# Patient Record
Sex: FEMALE | Race: WHITE | Employment: OTHER | URBAN - METROPOLITAN AREA
[De-identification: names, ages, dates, MRNs, and addresses within clinical notes are randomized per-mention and may not be internally consistent; named-entity substitution may affect disease eponyms.]

---

## 2024-03-09 ENCOUNTER — HOSPITAL ENCOUNTER (INPATIENT)
Age: 44
LOS: 9 days | Discharge: HOME | DRG: 885 | End: 2024-03-18
Attending: PSYCHIATRY & NEUROLOGY | Admitting: PSYCHIATRY & NEUROLOGY
Payer: MEDICARE

## 2024-03-09 PROBLEM — F29 PSYCHOSIS, UNSPECIFIED PSYCHOSIS TYPE (HCC): Status: ACTIVE | Noted: 2024-03-09

## 2024-03-09 PROBLEM — F23 ACUTE PSYCHOSIS (HCC): Status: ACTIVE | Noted: 2024-03-09

## 2024-03-09 PROCEDURE — 1240000000 HC EMOTIONAL WELLNESS R&B

## 2024-03-09 PROCEDURE — 6370000000 HC RX 637 (ALT 250 FOR IP)

## 2024-03-09 RX ORDER — HYDROXYZINE 50 MG/1
50 TABLET, FILM COATED ORAL 3 TIMES DAILY PRN
Status: DISCONTINUED | OUTPATIENT
Start: 2024-03-09 | End: 2024-03-18 | Stop reason: HOSPADM

## 2024-03-09 RX ORDER — DIPHENHYDRAMINE HYDROCHLORIDE 50 MG/ML
50 INJECTION INTRAMUSCULAR; INTRAVENOUS EVERY 4 HOURS PRN
Status: DISCONTINUED | OUTPATIENT
Start: 2024-03-09 | End: 2024-03-18 | Stop reason: HOSPADM

## 2024-03-09 RX ORDER — ACETAMINOPHEN 325 MG/1
650 TABLET ORAL EVERY 4 HOURS PRN
Status: DISCONTINUED | OUTPATIENT
Start: 2024-03-09 | End: 2024-03-18 | Stop reason: HOSPADM

## 2024-03-09 RX ORDER — IBUPROFEN 400 MG/1
400 TABLET ORAL EVERY 6 HOURS PRN
Status: DISCONTINUED | OUTPATIENT
Start: 2024-03-09 | End: 2024-03-18 | Stop reason: HOSPADM

## 2024-03-09 RX ORDER — TRAZODONE HYDROCHLORIDE 50 MG/1
50 TABLET ORAL NIGHTLY PRN
Status: DISCONTINUED | OUTPATIENT
Start: 2024-03-09 | End: 2024-03-18 | Stop reason: HOSPADM

## 2024-03-09 RX ORDER — OLANZAPINE 5 MG/1
5 TABLET ORAL EVERY 4 HOURS PRN
Status: DISCONTINUED | OUTPATIENT
Start: 2024-03-09 | End: 2024-03-12

## 2024-03-09 RX ORDER — POLYETHYLENE GLYCOL 3350 17 G
2 POWDER IN PACKET (EA) ORAL
Status: DISCONTINUED | OUTPATIENT
Start: 2024-03-09 | End: 2024-03-18 | Stop reason: HOSPADM

## 2024-03-09 RX ORDER — MAGNESIUM HYDROXIDE/ALUMINUM HYDROXICE/SIMETHICONE 120; 1200; 1200 MG/30ML; MG/30ML; MG/30ML
30 SUSPENSION ORAL EVERY 6 HOURS PRN
Status: DISCONTINUED | OUTPATIENT
Start: 2024-03-09 | End: 2024-03-18 | Stop reason: HOSPADM

## 2024-03-09 RX ADMIN — TRAZODONE HYDROCHLORIDE 50 MG: 50 TABLET ORAL at 22:10

## 2024-03-09 RX ADMIN — NICOTINE POLACRILEX 2 MG: 2 LOZENGE ORAL at 22:10

## 2024-03-09 RX ADMIN — HYDROXYZINE HYDROCHLORIDE 50 MG: 50 TABLET, FILM COATED ORAL at 22:10

## 2024-03-09 ASSESSMENT — LIFESTYLE VARIABLES: HOW OFTEN DO YOU HAVE A DRINK CONTAINING ALCOHOL: NEVER

## 2024-03-10 PROCEDURE — 84439 ASSAY OF FREE THYROXINE: CPT

## 2024-03-10 PROCEDURE — 36415 COLL VENOUS BLD VENIPUNCTURE: CPT

## 2024-03-10 PROCEDURE — 1240000000 HC EMOTIONAL WELLNESS R&B

## 2024-03-10 PROCEDURE — 99223 1ST HOSP IP/OBS HIGH 75: CPT

## 2024-03-10 PROCEDURE — 6370000000 HC RX 637 (ALT 250 FOR IP)

## 2024-03-10 RX ORDER — LEVOTHYROXINE SODIUM 0.05 MG/1
50 TABLET ORAL DAILY
Status: DISCONTINUED | OUTPATIENT
Start: 2024-03-10 | End: 2024-03-18 | Stop reason: HOSPADM

## 2024-03-10 RX ORDER — ALBUTEROL SULFATE 90 UG/1
2 AEROSOL, METERED RESPIRATORY (INHALATION) EVERY 6 HOURS PRN
Status: ON HOLD | COMMUNITY
End: 2024-03-18 | Stop reason: HOSPADM

## 2024-03-10 RX ORDER — DIVALPROEX SODIUM 500 MG/1
2000 TABLET, EXTENDED RELEASE ORAL NIGHTLY
COMMUNITY
Start: 2024-01-09

## 2024-03-10 RX ORDER — DIVALPROEX SODIUM 500 MG/1
1000 TABLET, EXTENDED RELEASE ORAL DAILY
Status: DISCONTINUED | OUTPATIENT
Start: 2024-03-10 | End: 2024-03-13

## 2024-03-10 RX ORDER — LEVOTHYROXINE SODIUM 0.05 MG/1
50 TABLET ORAL DAILY
COMMUNITY
Start: 2024-01-09

## 2024-03-10 RX ORDER — PALIPERIDONE 3 MG/1
3 TABLET, EXTENDED RELEASE ORAL DAILY
Status: DISCONTINUED | OUTPATIENT
Start: 2024-03-10 | End: 2024-03-12

## 2024-03-10 RX ADMIN — DIVALPROEX SODIUM 1000 MG: 500 TABLET, FILM COATED, EXTENDED RELEASE ORAL at 14:19

## 2024-03-10 RX ADMIN — NICOTINE POLACRILEX 2 MG: 2 LOZENGE ORAL at 07:19

## 2024-03-10 RX ADMIN — NICOTINE POLACRILEX 2 MG: 2 LOZENGE ORAL at 09:03

## 2024-03-10 RX ADMIN — NICOTINE POLACRILEX 2 MG: 2 LOZENGE ORAL at 15:42

## 2024-03-10 RX ADMIN — NICOTINE POLACRILEX 2 MG: 2 LOZENGE ORAL at 17:29

## 2024-03-10 RX ADMIN — LEVOTHYROXINE SODIUM 50 MCG: 50 TABLET ORAL at 14:19

## 2024-03-10 RX ADMIN — NICOTINE POLACRILEX 2 MG: 2 LOZENGE ORAL at 10:25

## 2024-03-10 RX ADMIN — OLANZAPINE 5 MG: 5 TABLET, FILM COATED ORAL at 22:32

## 2024-03-10 RX ADMIN — NICOTINE POLACRILEX 2 MG: 2 LOZENGE ORAL at 14:19

## 2024-03-10 RX ADMIN — TRAZODONE HYDROCHLORIDE 50 MG: 50 TABLET ORAL at 22:32

## 2024-03-10 RX ADMIN — NICOTINE POLACRILEX 2 MG: 2 LOZENGE ORAL at 22:32

## 2024-03-10 RX ADMIN — HYDROXYZINE HYDROCHLORIDE 50 MG: 50 TABLET, FILM COATED ORAL at 17:55

## 2024-03-10 RX ADMIN — HYDROXYZINE HYDROCHLORIDE 50 MG: 50 TABLET, FILM COATED ORAL at 07:19

## 2024-03-10 ASSESSMENT — PATIENT HEALTH QUESTIONNAIRE - PHQ9
SUM OF ALL RESPONSES TO PHQ9 QUESTIONS 1 & 2: 2
SUM OF ALL RESPONSES TO PHQ QUESTIONS 1-9: 2
SUM OF ALL RESPONSES TO PHQ QUESTIONS 1-9: 2
1. LITTLE INTEREST OR PLEASURE IN DOING THINGS: SEVERAL DAYS
2. FEELING DOWN, DEPRESSED OR HOPELESS: SEVERAL DAYS
SUM OF ALL RESPONSES TO PHQ QUESTIONS 1-9: 2
SUM OF ALL RESPONSES TO PHQ QUESTIONS 1-9: 2

## 2024-03-10 ASSESSMENT — SLEEP AND FATIGUE QUESTIONNAIRES
DO YOU USE A SLEEP AID: YES
DO YOU HAVE DIFFICULTY SLEEPING: YES
SLEEP PATTERN: UNABLE TO ASSESS
SLEEP PATTERN: DIFFICULTY FALLING ASLEEP;DISTURBED/INTERRUPTED SLEEP;INSOMNIA;RESTLESSNESS

## 2024-03-10 NOTE — PROGRESS NOTES
Behavioral Services  Medicare Certification Upon Admission    I certify that this patient's inpatient psychiatric hospital admission is medically necessary for:    [x] (1) Treatment which could reasonably be expected to improve this patient's condition,       [x] (2) Or for diagnostic study;     AND     [x](2) The inpatient psychiatric services are provided while the individual is under the care of a physician and are included in the individualized plan of care.    Estimated length of stay/service 2-5 days    Plan for post-hospital care outpatient services    Electronically signed by YO Rossi CNP on 3/10/2024 at 9:38 AM

## 2024-03-10 NOTE — H&P
Hospital Medicine History & Physical      PCP: No primary care provider on file.    Date of Admission: 3/9/2024    Date of Service: Pt seen/examined on 03/11/24       Chief Complaint:  No chief complaint on file.      History Of Present Illness:      The patient is a 43 y.o. female with PMHx hypothyroidism, bipolar disorder, and polysubstance abuse who presented to [facility] for AMS.  Patient was seen and evaluated in the ED by the ED medical provider, patient was medically cleared for admission to [facility].  This note serves as an admission medical H&P.    Patient denies any medical complaints at this time.    Past Medical History:        Diagnosis Date    Bipolar disorder (HCC)     Hypothyroid     OAB (overactive bladder)     Polysubstance abuse (HCC)        Past Surgical History:    History reviewed. No pertinent surgical history.    Medications Prior to Admission:    Prior to Admission medications    Medication Sig Start Date End Date Taking? Authorizing Provider   levothyroxine (SYNTHROID) 50 MCG tablet Take 1 tablet by mouth Daily 1/9/24  Yes Francesco Redman MD   divalproex (DEPAKOTE ER) 500 MG extended release tablet Take 2 tablets by mouth daily 1/9/24  Yes ProviderFrancesco MD   albuterol sulfate HFA (VENTOLIN HFA) 108 (90 Base) MCG/ACT inhaler Inhale 2 puffs into the lungs every 6 hours as needed for Wheezing   Yes ProviderFrancesco MD       Allergies:  Patient has no known allergies.    Social History:    Tobacco use: 2 ppd  ETOH use: denies  Illicit drug use: denies     Family History:   Positive as follows:    History reviewed. No pertinent family history.    REVIEW OF SYSTEMS:       Constitutional: Negative for fever   HENT: Negative for sore throat   Eyes: Negative for redness   Respiratory: Negative  for dyspnea, cough   Cardiovascular: Negative for chest pain   Gastrointestinal: Negative for vomiting, diarrhea   Genitourinary: Negative for hematuria   Musculoskeletal:
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
## 2024-03-10 NOTE — PROGRESS NOTES
Mary arrived on this unit with two medical transporters and one  at 20:30. Mary was ambulatory with a steady gait upon arrival. Security staff performed a thorough assessment of Mary and her belongings with a metal detector wand. No contraband was found on Mary or in her belongings. Mary was quite lethargic upon arrival. She is oriented to self, but disoriented to place, time, and situation. Mary denies SI, HI, and AVH. She was focused on smoking a cigarette & continued to ask this writer for a rolled cigarette that she had in her purse. This writer educated that smoking is against the unit policies. This writer offered her a nicotine lozenge, but was asleep before the order could be verified. Mary was agreeable to participating in the admission process.

## 2024-03-10 NOTE — BH NOTE
Mary out to the desk yelling and screaming at writer. She states she wants \"Ativan and a cigarette.\" Vistaril admin per PRN order for anxiety. She is laying down in her room at this time.

## 2024-03-10 NOTE — PROGRESS NOTES
4 Eyes Skin Assessment     NAME:  Mary Lucas  YOB: 1980  MEDICAL RECORD NUMBER:  [id number]    The patient is being assessed for  Admission    I agree that at least one RN has performed a thorough Head to Toe Skin Assessment on the patient. ALL assessment sites listed below have been assessed.      Areas assessed by both nurses:    Head, Face, Ears, Shoulders, Back, Chest, Arms, Elbows, Hands, Sacrum. Buttock, Coccyx, Ischium, and Legs. Feet and Heels        Does the Patient have a Wound? No noted wound(s)       Krishna Prevention initiated by RN: No    Wound Care Orders initiated by RN: No    Pressure Injury (Stage 3,4, Unstageable, DTI, NWPT, and Complex wounds) if present, place Wound referral order by RN under : No    New Ostomies, if present place, Ostomy referral order under : No     Nurse 1 eSignature: Electronically signed by Flavia Baron RN on 3/10/24 at 12:49 AM EST    **SHARE this note so that the co-signing nurse can place an eSignature**    Nurse 2 eSignature: {Esignature:[id number]}

## 2024-03-10 NOTE — BH NOTE
Pt at the nurses station snarling and yelling at another pt. She states \"Give me a lozenge, 150 micrograms of synthroid and 2 Ativan.\" Educated regarding med orders and Atarax offered. Atarax admin per PRN order. Pt got on the phone and called her brother, yelling at brother \"Get me out of this psych hell hole. Where is my  at,\" She then slammed down the phone. Pt going to shower now.

## 2024-03-10 NOTE — H&P
Patient labile and somewhat aggressive. H&P deferred to tomorrow.    Rosa Cuellar PA-C  3/10/2024   1:07 PM
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
## 2024-03-10 NOTE — PROGRESS NOTES
Home Medication Reconciliation Status          [] COMPLETE       Medication history has been reviewed and obtained from the following source(s):       [] patient/family verbal report             [] patient/family provided written list       [] external pharmacy   [] external facility list         []  Provider notified that home medication reconciliation is complete          [x] IN PROGRESS       Medication reconciliation marked in progress at this time due to:       [] patient/family poor historian      [] waiting arrival of family to clarify       [] waiting for accurate list        [x] external pharmacy needs called      * Follow up is needed.          [] UNABLE TO ASSESS       Medication reconciliation is incomplete and unable to assess at this time due to:       [] critical patient condition   [] patient is unresponsive        [] no family available                       [] unknown pharmacy       [] anonymous patient          * Follow up is needed.      [] Pharmacy consult placed for medication reconciliation assistance   Additional comments: Mary states that she takes medications, but is unable to provide the actual names of them. She told this writer she takes a \"nerve pill\" before bed. She is unable to identify the pharmacy she uses to fill her prescriptions. Notes from staff at [facility] indicate that Mary is not usually compliant with prescribed medications.

## 2024-03-11 PROBLEM — E87.6 HYPOKALEMIA: Status: ACTIVE | Noted: 2024-03-11

## 2024-03-11 PROBLEM — R79.89 ELEVATED TSH: Status: ACTIVE | Noted: 2024-03-11

## 2024-03-11 LAB — T4 FREE SERPL-MCNC: 0.3 NG/DL (ref 0.9–1.8)

## 2024-03-11 PROCEDURE — 1240000000 HC EMOTIONAL WELLNESS R&B

## 2024-03-11 PROCEDURE — 6370000000 HC RX 637 (ALT 250 FOR IP)

## 2024-03-11 PROCEDURE — 6360000002 HC RX W HCPCS

## 2024-03-11 PROCEDURE — 2580000003 HC RX 258

## 2024-03-11 PROCEDURE — 99221 1ST HOSP IP/OBS SF/LOW 40: CPT

## 2024-03-11 RX ORDER — IBUPROFEN 200 MG
TABLET ORAL 2 TIMES DAILY
Status: DISCONTINUED | OUTPATIENT
Start: 2024-03-11 | End: 2024-03-18 | Stop reason: HOSPADM

## 2024-03-11 RX ADMIN — NICOTINE POLACRILEX 2 MG: 2 LOZENGE ORAL at 23:57

## 2024-03-11 RX ADMIN — LEVOTHYROXINE SODIUM 50 MCG: 50 TABLET ORAL at 05:06

## 2024-03-11 RX ADMIN — DIVALPROEX SODIUM 1000 MG: 500 TABLET, FILM COATED, EXTENDED RELEASE ORAL at 07:42

## 2024-03-11 RX ADMIN — WATER 10 MG: 1 INJECTION INTRAMUSCULAR; INTRAVENOUS; SUBCUTANEOUS at 18:48

## 2024-03-11 RX ADMIN — IBUPROFEN 400 MG: 400 TABLET, FILM COATED ORAL at 19:33

## 2024-03-11 RX ADMIN — TRAZODONE HYDROCHLORIDE 50 MG: 50 TABLET ORAL at 20:52

## 2024-03-11 RX ADMIN — PALIPERIDONE 3 MG: 3 TABLET, EXTENDED RELEASE ORAL at 07:42

## 2024-03-11 RX ADMIN — NICOTINE POLACRILEX 2 MG: 2 LOZENGE ORAL at 01:40

## 2024-03-11 RX ADMIN — NICOTINE POLACRILEX 2 MG: 2 LOZENGE ORAL at 09:01

## 2024-03-11 RX ADMIN — NICOTINE POLACRILEX 2 MG: 2 LOZENGE ORAL at 05:03

## 2024-03-11 RX ADMIN — NICOTINE POLACRILEX 2 MG: 2 LOZENGE ORAL at 17:11

## 2024-03-11 RX ADMIN — POLYMYXIN B SULFATE, BACITRACIN ZINC, NEOMYCIN SULFATE: 5000; 3.5; 4 OINTMENT TOPICAL at 20:57

## 2024-03-11 RX ADMIN — NICOTINE POLACRILEX 2 MG: 2 LOZENGE ORAL at 22:49

## 2024-03-11 RX ADMIN — NICOTINE POLACRILEX 2 MG: 2 LOZENGE ORAL at 03:20

## 2024-03-11 RX ADMIN — NICOTINE POLACRILEX 2 MG: 2 LOZENGE ORAL at 19:41

## 2024-03-11 RX ADMIN — HYDROXYZINE HYDROCHLORIDE 50 MG: 50 TABLET, FILM COATED ORAL at 20:52

## 2024-03-11 RX ADMIN — IBUPROFEN 400 MG: 400 TABLET, FILM COATED ORAL at 01:47

## 2024-03-11 RX ADMIN — OLANZAPINE 5 MG: 5 TABLET, FILM COATED ORAL at 23:57

## 2024-03-11 RX ADMIN — NICOTINE POLACRILEX 2 MG: 2 LOZENGE ORAL at 07:55

## 2024-03-11 ASSESSMENT — PAIN DESCRIPTION - ORIENTATION: ORIENTATION: LOWER

## 2024-03-11 ASSESSMENT — PAIN SCALES - GENERAL
PAINLEVEL_OUTOF10: 0
PAINLEVEL_OUTOF10: 10
PAINLEVEL_OUTOF10: 7

## 2024-03-11 ASSESSMENT — PAIN DESCRIPTION - LOCATION
LOCATION: NECK
LOCATION: OTHER (COMMENT)

## 2024-03-11 ASSESSMENT — PAIN DESCRIPTION - DESCRIPTORS: DESCRIPTORS: ACHING

## 2024-03-11 NOTE — PROGRESS NOTES
Pt. At the desk, agitated because other patient was using the phone. She began banging on the desk, yelling for peer to get off the phone, it was her turn. Writer redirected but was unsuccessful. Patient calling writer and peer \"bitch\" asked patient to go to her room and calm- walked to her room and slammed the door stating she was not going in there. Offered PO zyprexa but refused. IM zyprexa given at 1850 in right deltoid. When giving injection, patient said \"I need this ativan, I love ativan\" reminded it was zyprexa and not ativan.

## 2024-03-11 NOTE — PROGRESS NOTES
Department of Psychiatry  AttendingProgress Note  Chief Complaint: Psychosis    Patient's chart was reviewed and collaborated with  about the treatment plan.    SUBJECTIVE:    Pt up on the unit.  She remains confused, disorganized, FOI.  Pt focused on her , states she was able to find his number, repeatedly calling.  Staff requested that she wait to call again, now that she knows he is safe.  Pt mentions to me that she was given an injection at [facility], staff there reports no injections in the last 5-6 years.  They report that she normally presents in a crisis and has not been prescribed meds recently due to poor compliance.  Pt unable to tell what meds she has taken recently.  Will continue current medications for stabilization and can discuss and JUNG when cognition improves.  Pt with a flat affect, poor historian and memory at this time.      Plan  Start Invega 3 mg daily (received JUNG Invega in Jan while inpatient, no record of injections after this)  Depakote ER 1000 mg daily    Patient is feeling unchanged. Suicidal ideation:  denies suicidal ideation.  Patient does not have medication side effects.    ROS: Patient has new complaints: no  Sleeping adequately:  Yes   Appetite adequate: Yes  Attending groups: No:   Visitors:No    OBJECTIVE    Physical  VITALS:  /76   Pulse 100   Temp 98.4 °F (36.9 °C) (Oral)   Resp 16   Ht 1.778 m (5' 10\")   Wt 78 kg (172 lb)   SpO2 98%   Breastfeeding No   BMI 24.68 kg/m²     Mental Status Examination:  Patients appearance was ill-appearing and hospital attire. Thoughts are Flight of ideas. Homicidal ideations none.  No abnormal movements, tics or mannerisms.  Memory intact Aims 0. Concentration Fair.   Alert and oriented X 4. Insight and Judgement impaired insight. Patient was cooperative. Patient gait normal. Mood labile, affect labile affect Hallucinations Absent, suicidal ideations no specific plan to harm self Speech

## 2024-03-11 NOTE — PROGRESS NOTES
Received pt asleep on bed at 1945. Seen at times. At 2215 seen awake, took dinner and snacks. Vitally stable. Noted anxious with bouts of impulsivity. A bit irritated but can be re-directed. Initiated interaction and stated that she should not be here. She was tearful and wanting to go home. Explained her present situation and somehow can be re-directed. She denies AVH and delusional thoughts but noted with flight of ideas. Can follow simple commands. PRN zyprexa, trazodone and lozenges given at 2232 with good effect. She was able to go back on sleep at 2339. Seen at times. Seen awake again at 0138 asking for lozenges. PRN lozenges given at 0140. Needs attended.

## 2024-03-12 PROCEDURE — 6370000000 HC RX 637 (ALT 250 FOR IP)

## 2024-03-12 PROCEDURE — 99232 SBSQ HOSP IP/OBS MODERATE 35: CPT

## 2024-03-12 PROCEDURE — 1240000000 HC EMOTIONAL WELLNESS R&B

## 2024-03-12 RX ORDER — OLANZAPINE 10 MG/1
10 TABLET ORAL EVERY 4 HOURS PRN
Status: DISCONTINUED | OUTPATIENT
Start: 2024-03-12 | End: 2024-03-18 | Stop reason: HOSPADM

## 2024-03-12 RX ADMIN — ACETAMINOPHEN 650 MG: 325 TABLET ORAL at 14:59

## 2024-03-12 RX ADMIN — NICOTINE POLACRILEX 2 MG: 2 LOZENGE ORAL at 20:43

## 2024-03-12 RX ADMIN — NICOTINE POLACRILEX 2 MG: 2 LOZENGE ORAL at 03:51

## 2024-03-12 RX ADMIN — POLYMYXIN B SULFATE, BACITRACIN ZINC, NEOMYCIN SULFATE: 5000; 3.5; 4 OINTMENT TOPICAL at 20:44

## 2024-03-12 RX ADMIN — NICOTINE POLACRILEX 2 MG: 2 LOZENGE ORAL at 06:32

## 2024-03-12 RX ADMIN — OLANZAPINE 10 MG: 10 TABLET, FILM COATED ORAL at 18:34

## 2024-03-12 RX ADMIN — TRAZODONE HYDROCHLORIDE 50 MG: 50 TABLET ORAL at 20:43

## 2024-03-12 RX ADMIN — NICOTINE POLACRILEX 2 MG: 2 LOZENGE ORAL at 19:09

## 2024-03-12 RX ADMIN — POLYMYXIN B SULFATE, BACITRACIN ZINC, NEOMYCIN SULFATE: 5000; 3.5; 4 OINTMENT TOPICAL at 08:49

## 2024-03-12 RX ADMIN — LEVOTHYROXINE SODIUM 50 MCG: 50 TABLET ORAL at 06:28

## 2024-03-12 RX ADMIN — DIVALPROEX SODIUM 1000 MG: 500 TABLET, FILM COATED, EXTENDED RELEASE ORAL at 08:49

## 2024-03-12 RX ADMIN — NICOTINE POLACRILEX 2 MG: 2 LOZENGE ORAL at 15:58

## 2024-03-12 RX ADMIN — IBUPROFEN 400 MG: 400 TABLET, FILM COATED ORAL at 18:34

## 2024-03-12 RX ADMIN — PALIPERIDONE 3 MG: 3 TABLET, EXTENDED RELEASE ORAL at 08:49

## 2024-03-12 RX ADMIN — NICOTINE POLACRILEX 2 MG: 2 LOZENGE ORAL at 17:42

## 2024-03-12 RX ADMIN — NICOTINE POLACRILEX 2 MG: 2 LOZENGE ORAL at 23:35

## 2024-03-12 RX ADMIN — NICOTINE POLACRILEX 2 MG: 2 LOZENGE ORAL at 11:24

## 2024-03-12 RX ADMIN — NICOTINE POLACRILEX 2 MG: 2 LOZENGE ORAL at 02:33

## 2024-03-12 RX ADMIN — HYDROXYZINE HYDROCHLORIDE 50 MG: 50 TABLET, FILM COATED ORAL at 20:43

## 2024-03-12 RX ADMIN — NICOTINE POLACRILEX 2 MG: 2 LOZENGE ORAL at 13:04

## 2024-03-12 RX ADMIN — NICOTINE POLACRILEX 2 MG: 2 LOZENGE ORAL at 14:14

## 2024-03-12 RX ADMIN — ACETAMINOPHEN 650 MG: 325 TABLET ORAL at 00:40

## 2024-03-12 RX ADMIN — NICOTINE POLACRILEX 2 MG: 2 LOZENGE ORAL at 10:22

## 2024-03-12 RX ADMIN — OLANZAPINE 10 MG: 10 TABLET, FILM COATED ORAL at 23:35

## 2024-03-12 ASSESSMENT — PAIN DESCRIPTION - LOCATION
LOCATION: FOOT
LOCATION: TEETH
LOCATION: HEAD
LOCATION: TEETH

## 2024-03-12 ASSESSMENT — PAIN SCALES - WONG BAKER: WONGBAKER_NUMERICALRESPONSE: NO HURT

## 2024-03-12 ASSESSMENT — PAIN DESCRIPTION - DESCRIPTORS
DESCRIPTORS: ACHING;DISCOMFORT
DESCRIPTORS: ACHING
DESCRIPTORS: ACHING

## 2024-03-12 ASSESSMENT — PAIN SCALES - GENERAL
PAINLEVEL_OUTOF10: 5
PAINLEVEL_OUTOF10: 0
PAINLEVEL_OUTOF10: 0
PAINLEVEL_OUTOF10: 5
PAINLEVEL_OUTOF10: 5

## 2024-03-12 ASSESSMENT — PAIN DESCRIPTION - ORIENTATION
ORIENTATION: UPPER
ORIENTATION: UPPER
ORIENTATION: RIGHT

## 2024-03-12 NOTE — PROGRESS NOTES
Department of Psychiatry  AttendingProgress Note  Chief Complaint: Psychosis    Patient's chart was reviewed and collaborated with  about the treatment plan.    SUBJECTIVE:    Pt up on the unit.  Pt remains with labile moods, can be speaking calmly and then begin yelling for no reason.  Able to be redirected.  FOI, tangential, disorganized.  I discussed her medications, starting back on her JUNG Invega (Given while inpatient at [facility], Invega Sustena 234 mg on 1/3/2024 and 156 mg IM on 1/7/2024. Next dose of 156 mg IM was due on 2/5/2024, no record of receiving). Pt continues to tell me she got Prolixin at [facility], we discussed them not treating her for several years.  Pt states it is because she smoked crack before her last few appointments and they told her to leave.  Pt focused on running for president in 2025.      Plan  Start Invega 3 mg daily (received JUNG Invega in Jan while inpatient, no record of injections after this)  Depakote ER 1000 mg daily  3/12 - Stop PO Invega, IM Invega Sustenna 156 mg given    Patient is feeling unchanged. Suicidal ideation:  denies suicidal ideation.  Patient does not have medication side effects.    ROS: Patient has new complaints: no  Sleeping adequately:  Yes   Appetite adequate: Yes  Attending groups: No:   Visitors:No    OBJECTIVE    Physical  VITALS:  BP (!) 98/57   Pulse 97   Temp 97.5 °F (36.4 °C) (Oral)   Resp 16   Ht 1.778 m (5' 10\")   Wt 78 kg (172 lb)   SpO2 98%   Breastfeeding No   BMI 24.68 kg/m²     Mental Status Examination:  Patients appearance was ill-appearing and hospital attire. Thoughts are Flight of ideas. Homicidal ideations none.  No abnormal movements, tics or mannerisms.  Memory intact Aims 0. Concentration Fair.   Alert and oriented X 4. Insight and Judgement impaired insight. Patient was cooperative. Patient gait normal. Mood labile, affect labile affect Hallucinations Absent, suicidal ideations no specific plan to

## 2024-03-12 NOTE — BH NOTE
Patient continues to request and demand ativan when told she does not have ativan ordered patient states \" well what about giving me valium then.'' Patient ask to make a phone call the [facility]. Patient observed on phone stating \" I told you they won't give me my damn ativan, I'll just get it when I get home.. I know I smoked crack I told them.\"

## 2024-03-12 NOTE — PROGRESS NOTES
2052, patient noted with bouts of anxiousness, irritable at times, Atarax 50mg & Trazodone 50mg given as PRN.

## 2024-03-12 NOTE — BH NOTE
Patient irritable yelling wanting ativan then ask for ibuprofen for tooth pain and \" a prn\" patient irritable and offered prn zyprexa for agitation. Patient agreeable. Prn Ibuprofen and zyprexa received.

## 2024-03-13 LAB — VALPROATE SERPL-MCNC: 41 UG/ML (ref 50–100)

## 2024-03-13 PROCEDURE — 6370000000 HC RX 637 (ALT 250 FOR IP)

## 2024-03-13 PROCEDURE — 6360000002 HC RX W HCPCS

## 2024-03-13 PROCEDURE — 2580000003 HC RX 258

## 2024-03-13 PROCEDURE — 36415 COLL VENOUS BLD VENIPUNCTURE: CPT

## 2024-03-13 PROCEDURE — 80164 ASSAY DIPROPYLACETIC ACD TOT: CPT

## 2024-03-13 PROCEDURE — 1240000000 HC EMOTIONAL WELLNESS R&B

## 2024-03-13 PROCEDURE — 99232 SBSQ HOSP IP/OBS MODERATE 35: CPT

## 2024-03-13 RX ORDER — HALOPERIDOL 5 MG/1
5 TABLET ORAL EVERY MORNING
COMMUNITY

## 2024-03-13 RX ORDER — DIVALPROEX SODIUM 500 MG/1
1500 TABLET, EXTENDED RELEASE ORAL DAILY
Status: DISCONTINUED | OUTPATIENT
Start: 2024-03-14 | End: 2024-03-18 | Stop reason: HOSPADM

## 2024-03-13 RX ORDER — HALOPERIDOL 5 MG/1
5 TABLET ORAL EVERY MORNING
Status: DISCONTINUED | OUTPATIENT
Start: 2024-03-13 | End: 2024-03-18 | Stop reason: HOSPADM

## 2024-03-13 RX ORDER — ALPRAZOLAM 0.5 MG/1
0.5 TABLET ORAL 2 TIMES DAILY
Status: ON HOLD | COMMUNITY
End: 2024-03-18 | Stop reason: HOSPADM

## 2024-03-13 RX ORDER — LORAZEPAM 2 MG/1
2 TABLET ORAL ONCE
Status: COMPLETED | OUTPATIENT
Start: 2024-03-13 | End: 2024-03-13

## 2024-03-13 RX ORDER — HYDROXYZINE 50 MG/1
50 TABLET, FILM COATED ORAL 3 TIMES DAILY PRN
COMMUNITY

## 2024-03-13 RX ORDER — HALOPERIDOL 10 MG/1
10 TABLET ORAL NIGHTLY
COMMUNITY

## 2024-03-13 RX ORDER — LORAZEPAM 2 MG/ML
2 INJECTION INTRAMUSCULAR ONCE
Status: DISCONTINUED | OUTPATIENT
Start: 2024-03-13 | End: 2024-03-13

## 2024-03-13 RX ORDER — HALOPERIDOL 10 MG/1
10 TABLET ORAL NIGHTLY
Status: DISCONTINUED | OUTPATIENT
Start: 2024-03-13 | End: 2024-03-18 | Stop reason: HOSPADM

## 2024-03-13 RX ADMIN — IBUPROFEN 400 MG: 400 TABLET, FILM COATED ORAL at 01:20

## 2024-03-13 RX ADMIN — LORAZEPAM 2 MG: 2 TABLET ORAL at 10:59

## 2024-03-13 RX ADMIN — TRAZODONE HYDROCHLORIDE 50 MG: 50 TABLET ORAL at 22:19

## 2024-03-13 RX ADMIN — NICOTINE POLACRILEX 2 MG: 2 LOZENGE ORAL at 15:23

## 2024-03-13 RX ADMIN — NICOTINE POLACRILEX 2 MG: 2 LOZENGE ORAL at 18:41

## 2024-03-13 RX ADMIN — HALOPERIDOL 5 MG: 5 TABLET ORAL at 10:29

## 2024-03-13 RX ADMIN — NICOTINE POLACRILEX 2 MG: 2 LOZENGE ORAL at 08:13

## 2024-03-13 RX ADMIN — NICOTINE POLACRILEX 2 MG: 2 LOZENGE ORAL at 07:12

## 2024-03-13 RX ADMIN — NICOTINE POLACRILEX 2 MG: 2 LOZENGE ORAL at 10:30

## 2024-03-13 RX ADMIN — NICOTINE POLACRILEX 2 MG: 2 LOZENGE ORAL at 04:14

## 2024-03-13 RX ADMIN — HYDROXYZINE HYDROCHLORIDE 50 MG: 50 TABLET, FILM COATED ORAL at 13:19

## 2024-03-13 RX ADMIN — NICOTINE POLACRILEX 2 MG: 2 LOZENGE ORAL at 09:27

## 2024-03-13 RX ADMIN — LEVOTHYROXINE SODIUM 50 MCG: 50 TABLET ORAL at 05:47

## 2024-03-13 RX ADMIN — NICOTINE POLACRILEX 2 MG: 2 LOZENGE ORAL at 01:21

## 2024-03-13 RX ADMIN — HYDROXYZINE HYDROCHLORIDE 50 MG: 50 TABLET, FILM COATED ORAL at 22:19

## 2024-03-13 RX ADMIN — NICOTINE POLACRILEX 2 MG: 2 LOZENGE ORAL at 03:03

## 2024-03-13 RX ADMIN — NICOTINE POLACRILEX 2 MG: 2 LOZENGE ORAL at 17:38

## 2024-03-13 RX ADMIN — NICOTINE POLACRILEX 2 MG: 2 LOZENGE ORAL at 05:47

## 2024-03-13 RX ADMIN — NICOTINE POLACRILEX 2 MG: 2 LOZENGE ORAL at 13:19

## 2024-03-13 RX ADMIN — WATER 10 MG: 1 INJECTION INTRAMUSCULAR; INTRAVENOUS; SUBCUTANEOUS at 09:38

## 2024-03-13 RX ADMIN — DIVALPROEX SODIUM 1000 MG: 500 TABLET, FILM COATED, EXTENDED RELEASE ORAL at 08:13

## 2024-03-13 ASSESSMENT — PAIN DESCRIPTION - DESCRIPTORS: DESCRIPTORS: THROBBING

## 2024-03-13 ASSESSMENT — PAIN SCALES - GENERAL
PAINLEVEL_OUTOF10: 10
PAINLEVEL_OUTOF10: 0

## 2024-03-13 ASSESSMENT — PAIN DESCRIPTION - LOCATION: LOCATION: TEETH

## 2024-03-13 ASSESSMENT — PAIN DESCRIPTION - ORIENTATION: ORIENTATION: RIGHT;LOWER

## 2024-03-13 NOTE — BH NOTE
ANIA completed and faxed the forms for Probate Court to [place] [phone]. ANIA anticipates probate to be scheduled for next Tuesday 3/19/24.    Electronically signed by MILLER Billingsley LSW on 3/13/2024 at 4:06 PM

## 2024-03-13 NOTE — PROGRESS NOTES
Pt out for dinner and made some phone calls. She is more calm at this time not yelling at staff but does remain demanding. No one answered. She is talking about d/c and not wanting to be probated. Discussed signing in voluntary to patient and explain this would keep the decision for her d/c to be between her and her provider. Patient agreeable to sign in voluntary at this time.
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
## 2024-03-13 NOTE — PROGRESS NOTES
Pt remains elevated yelling and upset that she is not getting discharged. She is unable to give an address for d/c and has FOI and non sensible language when discussing d/c plans. 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  She goes into room and bangs on walls and she does attempt to climb desk but gets down quickly when directed. Pt is oriented to person only at this time. She did eat breakfast and is noted to be urinating with no complaints. Patient also denies pain.

## 2024-03-13 NOTE — PROGRESS NOTES
Pt took morning med and thinks she is going home today advsd her that she needs to speak to the doctor when she will be d/c

## 2024-03-13 NOTE — PROGRESS NOTES
Patient came out to the team station & was yelling loudly asking for a cigarette & 2 ativan. Patient demanded that I get her ativan now. \"It is prescribed\". Patient was instructed that she was not ordered ativan here. Patient was given commit lozenge 2 mg po for nicotine craving & zyprexa 10 mg given po for agitation. Patient returned to her room. Laura Carvalho R.N
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
## 2024-03-14 PROCEDURE — 99232 SBSQ HOSP IP/OBS MODERATE 35: CPT

## 2024-03-14 PROCEDURE — 1240000000 HC EMOTIONAL WELLNESS R&B

## 2024-03-14 PROCEDURE — 6370000000 HC RX 637 (ALT 250 FOR IP)

## 2024-03-14 RX ORDER — NICOTINE 21 MG/24HR
1 PATCH, TRANSDERMAL 24 HOURS TRANSDERMAL DAILY
Status: DISCONTINUED | OUTPATIENT
Start: 2024-03-14 | End: 2024-03-18 | Stop reason: HOSPADM

## 2024-03-14 RX ADMIN — TRAZODONE HYDROCHLORIDE 50 MG: 50 TABLET ORAL at 21:05

## 2024-03-14 RX ADMIN — NICOTINE POLACRILEX 2 MG: 2 LOZENGE ORAL at 16:28

## 2024-03-14 RX ADMIN — NICOTINE POLACRILEX 2 MG: 2 LOZENGE ORAL at 05:32

## 2024-03-14 RX ADMIN — OLANZAPINE 10 MG: 10 TABLET, FILM COATED ORAL at 21:05

## 2024-03-14 RX ADMIN — NICOTINE POLACRILEX 2 MG: 2 LOZENGE ORAL at 00:43

## 2024-03-14 RX ADMIN — NICOTINE POLACRILEX 2 MG: 2 LOZENGE ORAL at 20:36

## 2024-03-14 RX ADMIN — NICOTINE POLACRILEX 2 MG: 2 LOZENGE ORAL at 15:26

## 2024-03-14 RX ADMIN — NICOTINE POLACRILEX 2 MG: 2 LOZENGE ORAL at 06:49

## 2024-03-14 RX ADMIN — NICOTINE POLACRILEX 2 MG: 2 LOZENGE ORAL at 19:11

## 2024-03-14 RX ADMIN — POLYMYXIN B SULFATE, BACITRACIN ZINC, NEOMYCIN SULFATE: 5000; 3.5; 4 OINTMENT TOPICAL at 01:05

## 2024-03-14 RX ADMIN — POLYMYXIN B SULFATE, BACITRACIN ZINC, NEOMYCIN SULFATE: 5000; 3.5; 4 OINTMENT TOPICAL at 23:00

## 2024-03-14 RX ADMIN — ACETAMINOPHEN 650 MG: 325 TABLET ORAL at 05:27

## 2024-03-14 RX ADMIN — NICOTINE POLACRILEX 2 MG: 2 LOZENGE ORAL at 12:26

## 2024-03-14 RX ADMIN — NICOTINE POLACRILEX 2 MG: 2 LOZENGE ORAL at 09:18

## 2024-03-14 RX ADMIN — NICOTINE POLACRILEX 2 MG: 2 LOZENGE ORAL at 22:40

## 2024-03-14 RX ADMIN — NICOTINE POLACRILEX 2 MG: 2 LOZENGE ORAL at 11:18

## 2024-03-14 RX ADMIN — POLYMYXIN B SULFATE, BACITRACIN ZINC, NEOMYCIN SULFATE: 5000; 3.5; 4 OINTMENT TOPICAL at 00:50

## 2024-03-14 RX ADMIN — LEVOTHYROXINE SODIUM 50 MCG: 50 TABLET ORAL at 05:27

## 2024-03-14 RX ADMIN — NICOTINE POLACRILEX 2 MG: 2 LOZENGE ORAL at 08:13

## 2024-03-14 RX ADMIN — DIVALPROEX SODIUM 1500 MG: 500 TABLET, FILM COATED, EXTENDED RELEASE ORAL at 08:13

## 2024-03-14 RX ADMIN — NICOTINE POLACRILEX 2 MG: 2 LOZENGE ORAL at 17:12

## 2024-03-14 RX ADMIN — NICOTINE POLACRILEX 2 MG: 2 LOZENGE ORAL at 10:16

## 2024-03-14 ASSESSMENT — PAIN DESCRIPTION - LOCATION: LOCATION: HEAD

## 2024-03-14 NOTE — PROGRESS NOTES
Pt noted to be sitting at nursing station RTIS amb talking to her self and having conversations with herself. She has had moments of increased agitations and yelling on unit but has remained overall more calm so far this shift.
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
## 2024-03-14 NOTE — PROGRESS NOTES
Pt has two bags full of coloring books and magazines from unit attempt to remove them and she grabs them from me and states aaron gave them to her. Explain to her that we do not allow patients to give other patients personal belongings but only allow them to donate to unit and she take them back to her room and states \"fuck you\" told her when she was d/c they would not allow her to keep all the belongings and she has been warned.

## 2024-03-14 NOTE — PROGRESS NOTES
Pt is visible on unit this morning and she is more calm and cooperative but remains demanding. She is A&O X3 disoriented to situation stating she is her [place] and states \"we are both pregnant\". . Pt denies current SI/HI/VH/AH and agrees to communicate with staff if no longer feel safe or in control. She does start yelling when asking questions. She makes a phone call this am but will not tell me who she called. She left a message stating she is getting discharged today. Discussed with patient there is no plan for her to be d/c and it would be between her and provider today. Pt did meet with  this morning and is currently eating breakfast with peers.

## 2024-03-14 NOTE — PROGRESS NOTES
0230  Pt put on call light to say she needed to go to the bathroom.She did get up to bathroom with stand by assistance from this nurse. Pt said she had a terrible HA pointing to frontal area and right neck.  Pt laid on bed for a minute or two and was asleep before medication could be given for HA.  Stated she wanting Ativan and Gabapentin. Also said she wanted stronger meds  for pain.  Pt fell right back to sleep.

## 2024-03-14 NOTE — PROGRESS NOTES
Pt has had a much better day today. She has had a couple outburst when she gets upset about not being d/c and yells very loud and did slam door X1. Overall her behaviors have been better and she has not been as elevated. She has made several phone calls and is able to remain appropriate. Pt still has FOI and disorganized speech and is noted to be RTIS t/o shift. +meals. No prn needed.

## 2024-03-15 PROCEDURE — 6370000000 HC RX 637 (ALT 250 FOR IP)

## 2024-03-15 PROCEDURE — 1240000000 HC EMOTIONAL WELLNESS R&B

## 2024-03-15 PROCEDURE — 99232 SBSQ HOSP IP/OBS MODERATE 35: CPT

## 2024-03-15 PROCEDURE — 6360000002 HC RX W HCPCS

## 2024-03-15 RX ADMIN — POLYMYXIN B SULFATE, BACITRACIN ZINC, NEOMYCIN SULFATE: 5000; 3.5; 4 OINTMENT TOPICAL at 10:06

## 2024-03-15 RX ADMIN — NICOTINE POLACRILEX 2 MG: 2 LOZENGE ORAL at 11:52

## 2024-03-15 RX ADMIN — DIVALPROEX SODIUM 1500 MG: 500 TABLET, FILM COATED, EXTENDED RELEASE ORAL at 07:40

## 2024-03-15 RX ADMIN — OLANZAPINE 10 MG: 10 TABLET, FILM COATED ORAL at 19:33

## 2024-03-15 RX ADMIN — NICOTINE POLACRILEX 2 MG: 2 LOZENGE ORAL at 08:06

## 2024-03-15 RX ADMIN — HALOPERIDOL 10 MG: 10 TABLET ORAL at 00:39

## 2024-03-15 RX ADMIN — IBUPROFEN 400 MG: 400 TABLET, FILM COATED ORAL at 23:01

## 2024-03-15 RX ADMIN — NICOTINE POLACRILEX 2 MG: 2 LOZENGE ORAL at 12:40

## 2024-03-15 RX ADMIN — LEVOTHYROXINE SODIUM 50 MCG: 50 TABLET ORAL at 07:00

## 2024-03-15 RX ADMIN — POLYMYXIN B SULFATE, BACITRACIN ZINC, NEOMYCIN SULFATE: 5000; 3.5; 4 OINTMENT TOPICAL at 20:35

## 2024-03-15 RX ADMIN — NICOTINE POLACRILEX 2 MG: 2 LOZENGE ORAL at 14:49

## 2024-03-15 RX ADMIN — DIPHENHYDRAMINE HYDROCHLORIDE 50 MG: 50 INJECTION INTRAMUSCULAR; INTRAVENOUS at 21:50

## 2024-03-15 RX ADMIN — NICOTINE POLACRILEX 2 MG: 2 LOZENGE ORAL at 00:17

## 2024-03-15 RX ADMIN — NICOTINE POLACRILEX 2 MG: 2 LOZENGE ORAL at 03:39

## 2024-03-15 RX ADMIN — HALOPERIDOL 5 MG: 5 TABLET ORAL at 07:40

## 2024-03-15 RX ADMIN — NICOTINE POLACRILEX 2 MG: 2 LOZENGE ORAL at 16:43

## 2024-03-15 RX ADMIN — NICOTINE POLACRILEX 2 MG: 2 LOZENGE ORAL at 19:27

## 2024-03-15 RX ADMIN — NICOTINE POLACRILEX 2 MG: 2 LOZENGE ORAL at 02:14

## 2024-03-15 RX ADMIN — NICOTINE POLACRILEX 2 MG: 2 LOZENGE ORAL at 07:07

## 2024-03-15 RX ADMIN — NICOTINE POLACRILEX 2 MG: 2 LOZENGE ORAL at 10:43

## 2024-03-15 RX ADMIN — HYDROXYZINE HYDROCHLORIDE 50 MG: 50 TABLET, FILM COATED ORAL at 20:35

## 2024-03-15 RX ADMIN — NICOTINE POLACRILEX 2 MG: 2 LOZENGE ORAL at 18:05

## 2024-03-15 RX ADMIN — NICOTINE POLACRILEX 2 MG: 2 LOZENGE ORAL at 13:54

## 2024-03-15 RX ADMIN — NICOTINE POLACRILEX 2 MG: 2 LOZENGE ORAL at 20:53

## 2024-03-15 RX ADMIN — NICOTINE POLACRILEX 2 MG: 2 LOZENGE ORAL at 09:44

## 2024-03-15 ASSESSMENT — PAIN SCALES - GENERAL: PAINLEVEL_OUTOF10: 5
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
## 2024-03-15 NOTE — BH NOTE
Mary at the nurses station yelling at writer to give \"me my benzos\". She keeps getting louder, then screams for security. Zyprexa admin per PRN order. She began to scream that she wanted a shot. Educated that she could not have IM Zyprexa after taking PO. She went to bed and is resting quietly now.

## 2024-03-16 PROCEDURE — 6370000000 HC RX 637 (ALT 250 FOR IP)

## 2024-03-16 PROCEDURE — 99233 SBSQ HOSP IP/OBS HIGH 50: CPT | Performed by: PSYCHIATRY & NEUROLOGY

## 2024-03-16 PROCEDURE — 1240000000 HC EMOTIONAL WELLNESS R&B

## 2024-03-16 RX ADMIN — NICOTINE POLACRILEX 2 MG: 2 LOZENGE ORAL at 13:59

## 2024-03-16 RX ADMIN — NICOTINE POLACRILEX 2 MG: 2 LOZENGE ORAL at 17:31

## 2024-03-16 RX ADMIN — NICOTINE POLACRILEX 2 MG: 2 LOZENGE ORAL at 23:54

## 2024-03-16 RX ADMIN — NICOTINE POLACRILEX 2 MG: 2 LOZENGE ORAL at 20:04

## 2024-03-16 RX ADMIN — NICOTINE POLACRILEX 2 MG: 2 LOZENGE ORAL at 10:09

## 2024-03-16 RX ADMIN — NICOTINE POLACRILEX 2 MG: 2 LOZENGE ORAL at 21:06

## 2024-03-16 RX ADMIN — LEVOTHYROXINE SODIUM 50 MCG: 50 TABLET ORAL at 07:42

## 2024-03-16 RX ADMIN — NICOTINE POLACRILEX 2 MG: 2 LOZENGE ORAL at 16:31

## 2024-03-16 RX ADMIN — NICOTINE POLACRILEX 2 MG: 2 LOZENGE ORAL at 00:31

## 2024-03-16 RX ADMIN — TRAZODONE HYDROCHLORIDE 50 MG: 50 TABLET ORAL at 22:15

## 2024-03-16 RX ADMIN — NICOTINE POLACRILEX 2 MG: 2 LOZENGE ORAL at 22:15

## 2024-03-16 RX ADMIN — NICOTINE POLACRILEX 2 MG: 2 LOZENGE ORAL at 12:41

## 2024-03-16 RX ADMIN — DIVALPROEX SODIUM 1500 MG: 500 TABLET, FILM COATED, EXTENDED RELEASE ORAL at 07:58

## 2024-03-16 RX ADMIN — OLANZAPINE 10 MG: 10 TABLET, FILM COATED ORAL at 04:14

## 2024-03-16 RX ADMIN — IBUPROFEN 400 MG: 400 TABLET, FILM COATED ORAL at 21:00

## 2024-03-16 RX ADMIN — HALOPERIDOL 5 MG: 5 TABLET ORAL at 08:03

## 2024-03-16 RX ADMIN — POLYMYXIN B SULFATE, BACITRACIN ZINC, NEOMYCIN SULFATE: 5000; 3.5; 4 OINTMENT TOPICAL at 10:32

## 2024-03-16 RX ADMIN — HYDROXYZINE HYDROCHLORIDE 50 MG: 50 TABLET, FILM COATED ORAL at 22:15

## 2024-03-16 RX ADMIN — OLANZAPINE 10 MG: 10 TABLET, FILM COATED ORAL at 07:58

## 2024-03-16 RX ADMIN — OLANZAPINE 10 MG: 10 TABLET, FILM COATED ORAL at 18:18

## 2024-03-16 RX ADMIN — NICOTINE POLACRILEX 2 MG: 2 LOZENGE ORAL at 14:53

## 2024-03-16 RX ADMIN — POLYMYXIN B SULFATE, BACITRACIN ZINC, NEOMYCIN SULFATE: 5000; 3.5; 4 OINTMENT TOPICAL at 20:54

## 2024-03-16 RX ADMIN — NICOTINE POLACRILEX 2 MG: 2 LOZENGE ORAL at 18:35

## 2024-03-16 ASSESSMENT — PAIN SCALES - GENERAL
PAINLEVEL_OUTOF10: 1
PAINLEVEL_OUTOF10: 0
PAINLEVEL_OUTOF10: 10
PAINLEVEL_OUTOF10: 0

## 2024-03-16 ASSESSMENT — PAIN DESCRIPTION - PAIN TYPE: TYPE: CHRONIC PAIN

## 2024-03-16 NOTE — PROGRESS NOTES
Patient requested and received nicotine vicente 2 mg . When asked if the lozenges help she screamed back,\"I want a damn cigarette.\" And she continued to yell at the nurses station, \"I'm going to smoke as soon as I get out of this hell hole!\" Patient said they help but she prefers a real cigarette.patient began to yell about nicotine and that she wanted to be transferred to somewhere that she can smoke.

## 2024-03-16 NOTE — PROGRESS NOTES
Patient became paranoid when another patient was given their scheduled meds. Patient claimed that this nurse was giving another patient her xanax. This nurse attempted to deescalate the situation by attempting to explain to the patient that that wasn't her medication and that other patient was not given xanax. Patient refused to deescalate. Refusing to listen to this nurse. Calling this nurse derogatory insults and yelling across unit. Charge nurse came over and talked patient down. Charge nurse encouraged patient to take IM benadryl for anxiety and agitation. Patient resting quietly in room currently.

## 2024-03-16 NOTE — PROGRESS NOTES
Patient irritable with loud tone of voice. Saying inappropriate words to staff. PRN zyprexa given at 0414. Settled on bed afterwards.

## 2024-03-16 NOTE — PROGRESS NOTES
Department of Psychiatry  AttendingProgress Note  Chief Complaint: psychosis   Mary was given prn zyprexa last night at 4AM. Was loud, irritable and and inappropriate behavior. Yelling at nursing.   Fell asleep after zyprexa. She was given 10 mg at 4AM and 8 AM .     Patient's chart was reviewed and collaborated with  about the treatment plan.  SUBJECTIVE:    Patient is feeling unchanged. Suicidal ideation:  denies suicidal ideation.  Patient does not have medication side effects.    ROS: Patient has new complaints: no  Sleeping adequately:  No:    Appetite adequate: Yes  Attending groups: No:   Visitors:No    OBJECTIVE    Physical  VITALS:  /66   Pulse 83   Temp 97.5 °F (36.4 °C) (Oral)   Resp 16   Ht 1.778 m (5' 10\")   Wt 78 kg (172 lb)   SpO2 99%   Breastfeeding No   BMI 24.68 kg/m²     Mental Status Examination:  Patients appearance was ill-appearing. Thoughts are Illogical. Homicidal ideations none.  No abnormal movements, tics or mannerisms.  Memory intact Aims 0. Concentration Poor.   Alert and oriented X 4. Insight and Judgement delusions. Patient was uncooperative, combative. Patient gait normal. Mood irritable and labile, affect labile affect Hallucinations Absent, suicidal ideations no specific plan to harm self Speech loud and profane  Data  Labs:   Admission on 03/09/2024   Component Date Value Ref Range Status    T4 Free 03/10/2024 0.3 (L)  0.9 - 1.8 ng/dL Final    Valproic Acid Lvl 03/13/2024 41.0 (L)  50.0 - 100.0 ug/mL Final            Medications  Current Facility-Administered Medications: nicotine (NICODERM CQ) 21 MG/24HR 1 patch, 1 patch, TransDERmal, Daily  haloperidol (HALDOL) tablet 5 mg, 5 mg, Oral, QAM  haloperidol (HALDOL) tablet 10 mg, 10 mg, Oral, Nightly  divalproex (DEPAKOTE ER) extended release tablet 1,500 mg, 1,500 mg, Oral, Daily  paliperidone palmitate ER (INVEGA SUSTENNA) IM injection 156 mg (Patient Supplied), 156 mg, IntraMUSCular, Q28

## 2024-03-17 PROCEDURE — 1240000000 HC EMOTIONAL WELLNESS R&B

## 2024-03-17 PROCEDURE — 6370000000 HC RX 637 (ALT 250 FOR IP): Performed by: PSYCHIATRY & NEUROLOGY

## 2024-03-17 PROCEDURE — 6370000000 HC RX 637 (ALT 250 FOR IP)

## 2024-03-17 RX ORDER — LORAZEPAM 2 MG/ML
2 INJECTION INTRAMUSCULAR EVERY 6 HOURS PRN
Status: DISCONTINUED | OUTPATIENT
Start: 2024-03-17 | End: 2024-03-17 | Stop reason: CLARIF

## 2024-03-17 RX ORDER — DIMETHICONE, OXYBENZONE, AND PADIMATE O 2; 2.5; 6.6 G/100G; G/100G; G/100G
STICK TOPICAL PRN
Status: DISCONTINUED | OUTPATIENT
Start: 2024-03-17 | End: 2024-03-18 | Stop reason: HOSPADM

## 2024-03-17 RX ORDER — HALOPERIDOL 10 MG/1
10 TABLET ORAL EVERY 6 HOURS PRN
Status: DISCONTINUED | OUTPATIENT
Start: 2024-03-17 | End: 2024-03-17 | Stop reason: CLARIF

## 2024-03-17 RX ORDER — LORAZEPAM 2 MG/1
2 TABLET ORAL EVERY 6 HOURS PRN
Status: DISCONTINUED | OUTPATIENT
Start: 2024-03-17 | End: 2024-03-17 | Stop reason: CLARIF

## 2024-03-17 RX ORDER — HALOPERIDOL 10 MG/1
10 TABLET ORAL EVERY 6 HOURS PRN
Status: DISCONTINUED | OUTPATIENT
Start: 2024-03-17 | End: 2024-03-18 | Stop reason: HOSPADM

## 2024-03-17 RX ORDER — HALOPERIDOL 5 MG/ML
10 INJECTION INTRAMUSCULAR EVERY 6 HOURS PRN
Status: DISCONTINUED | OUTPATIENT
Start: 2024-03-17 | End: 2024-03-18 | Stop reason: HOSPADM

## 2024-03-17 RX ORDER — HALOPERIDOL 5 MG/ML
10 INJECTION INTRAMUSCULAR EVERY 6 HOURS PRN
Status: DISCONTINUED | OUTPATIENT
Start: 2024-03-17 | End: 2024-03-17 | Stop reason: CLARIF

## 2024-03-17 RX ORDER — LORAZEPAM 2 MG/ML
2 INJECTION INTRAMUSCULAR EVERY 6 HOURS PRN
Status: DISCONTINUED | OUTPATIENT
Start: 2024-03-17 | End: 2024-03-18 | Stop reason: HOSPADM

## 2024-03-17 RX ORDER — LORAZEPAM 2 MG/1
2 TABLET ORAL EVERY 6 HOURS PRN
Status: DISCONTINUED | OUTPATIENT
Start: 2024-03-17 | End: 2024-03-18 | Stop reason: HOSPADM

## 2024-03-17 RX ADMIN — NICOTINE POLACRILEX 2 MG: 2 LOZENGE ORAL at 11:49

## 2024-03-17 RX ADMIN — HALOPERIDOL 5 MG: 5 TABLET ORAL at 08:22

## 2024-03-17 RX ADMIN — NICOTINE POLACRILEX 2 MG: 2 LOZENGE ORAL at 23:16

## 2024-03-17 RX ADMIN — OLANZAPINE 10 MG: 10 TABLET, FILM COATED ORAL at 00:02

## 2024-03-17 RX ADMIN — NICOTINE POLACRILEX 2 MG: 2 LOZENGE ORAL at 16:37

## 2024-03-17 RX ADMIN — OLANZAPINE 10 MG: 10 TABLET, FILM COATED ORAL at 08:24

## 2024-03-17 RX ADMIN — LEVOTHYROXINE SODIUM 50 MCG: 50 TABLET ORAL at 08:22

## 2024-03-17 RX ADMIN — NICOTINE POLACRILEX 2 MG: 2 LOZENGE ORAL at 13:41

## 2024-03-17 RX ADMIN — NICOTINE POLACRILEX 2 MG: 2 LOZENGE ORAL at 08:26

## 2024-03-17 RX ADMIN — POLYMYXIN B SULFATE, BACITRACIN ZINC, NEOMYCIN SULFATE: 5000; 3.5; 4 OINTMENT TOPICAL at 08:28

## 2024-03-17 RX ADMIN — NICOTINE POLACRILEX 2 MG: 2 LOZENGE ORAL at 09:56

## 2024-03-17 RX ADMIN — LORAZEPAM 2 MG: 2 TABLET ORAL at 03:22

## 2024-03-17 RX ADMIN — NICOTINE POLACRILEX 2 MG: 2 LOZENGE ORAL at 12:55

## 2024-03-17 RX ADMIN — NICOTINE POLACRILEX 2 MG: 2 LOZENGE ORAL at 14:57

## 2024-03-17 RX ADMIN — NICOTINE POLACRILEX 2 MG: 2 LOZENGE ORAL at 03:22

## 2024-03-17 RX ADMIN — POLYMYXIN B SULFATE, BACITRACIN ZINC, NEOMYCIN SULFATE: 5000; 3.5; 4 OINTMENT TOPICAL at 20:51

## 2024-03-17 RX ADMIN — LORAZEPAM 2 MG: 2 TABLET ORAL at 18:16

## 2024-03-17 RX ADMIN — NICOTINE POLACRILEX 2 MG: 2 LOZENGE ORAL at 17:49

## 2024-03-17 RX ADMIN — DIVALPROEX SODIUM 1500 MG: 500 TABLET, FILM COATED, EXTENDED RELEASE ORAL at 08:21

## 2024-03-17 RX ADMIN — NICOTINE POLACRILEX 2 MG: 2 LOZENGE ORAL at 20:47

## 2024-03-17 RX ADMIN — OLANZAPINE 10 MG: 10 TABLET, FILM COATED ORAL at 20:47

## 2024-03-17 RX ADMIN — OLANZAPINE 10 MG: 10 TABLET, FILM COATED ORAL at 15:41

## 2024-03-17 RX ADMIN — IBUPROFEN 400 MG: 400 TABLET, FILM COATED ORAL at 23:16

## 2024-03-17 RX ADMIN — HYDROXYZINE HYDROCHLORIDE 50 MG: 50 TABLET, FILM COATED ORAL at 15:41

## 2024-03-17 ASSESSMENT — PAIN SCALES - WONG BAKER: WONGBAKER_NUMERICALRESPONSE: NO HURT

## 2024-03-17 ASSESSMENT — PAIN DESCRIPTION - DESCRIPTORS: DESCRIPTORS: ACHING

## 2024-03-17 ASSESSMENT — PAIN SCALES - GENERAL
PAINLEVEL_OUTOF10: 0
PAINLEVEL_OUTOF10: 10
PAINLEVEL_OUTOF10: 0

## 2024-03-17 ASSESSMENT — PAIN DESCRIPTION - LOCATION: LOCATION: GENERALIZED

## 2024-03-17 NOTE — PROGRESS NOTES
0322, patient awake at this time, appears to be anxious, starting to shout & irritable, Ativan 2mg given as PRN & Nicotine Lozenge 2mg for smoking cessation, went back to her room post PRNs, to continue to monitor behavior at this time.

## 2024-03-18 VITALS
RESPIRATION RATE: 18 BRPM | DIASTOLIC BLOOD PRESSURE: 56 MMHG | OXYGEN SATURATION: 99 % | SYSTOLIC BLOOD PRESSURE: 94 MMHG | WEIGHT: 172 LBS | TEMPERATURE: 97.6 F | BODY MASS INDEX: 24.62 KG/M2 | HEIGHT: 70 IN | HEART RATE: 94 BPM

## 2024-03-18 PROCEDURE — 6370000000 HC RX 637 (ALT 250 FOR IP): Performed by: PSYCHIATRY & NEUROLOGY

## 2024-03-18 PROCEDURE — 6370000000 HC RX 637 (ALT 250 FOR IP)

## 2024-03-18 PROCEDURE — 99239 HOSP IP/OBS DSCHRG MGMT >30: CPT

## 2024-03-18 RX ORDER — DIVALPROEX SODIUM 500 MG/1
1500 TABLET, EXTENDED RELEASE ORAL DAILY
Qty: 30 TABLET | Refills: 3 | Status: SHIPPED | OUTPATIENT
Start: 2024-03-19

## 2024-03-18 RX ADMIN — LORAZEPAM 2 MG: 2 TABLET ORAL at 08:48

## 2024-03-18 RX ADMIN — NICOTINE POLACRILEX 2 MG: 2 LOZENGE ORAL at 07:11

## 2024-03-18 RX ADMIN — DIVALPROEX SODIUM 1500 MG: 500 TABLET, FILM COATED, EXTENDED RELEASE ORAL at 08:01

## 2024-03-18 RX ADMIN — LEVOTHYROXINE SODIUM 50 MCG: 50 TABLET ORAL at 07:11

## 2024-03-18 RX ADMIN — OLANZAPINE 10 MG: 10 TABLET, FILM COATED ORAL at 01:33

## 2024-03-18 RX ADMIN — NICOTINE POLACRILEX 2 MG: 2 LOZENGE ORAL at 03:27

## 2024-03-18 RX ADMIN — LORAZEPAM 2 MG: 2 TABLET ORAL at 01:33

## 2024-03-18 RX ADMIN — NICOTINE POLACRILEX 2 MG: 2 LOZENGE ORAL at 10:11

## 2024-03-18 RX ADMIN — NICOTINE POLACRILEX 2 MG: 2 LOZENGE ORAL at 08:39

## 2024-03-18 RX ADMIN — NICOTINE POLACRILEX 2 MG: 2 LOZENGE ORAL at 13:22

## 2024-03-18 RX ADMIN — NICOTINE POLACRILEX 2 MG: 2 LOZENGE ORAL at 01:33

## 2024-03-18 ASSESSMENT — PAIN SCALES - GENERAL: PAINLEVEL_OUTOF10: 0

## 2024-03-18 NOTE — PROGRESS NOTES
Bridge Appointment completed: Reviewed Discharge Instructions with patient.    Patient verbalizes understanding and agreement with the discharge plan using the teachback method.     Referral for Outpatient Tobacco Cessation Counseling, upon discharge (kylah X if applicable and completed):    ( )  Hospital staff assisted patient to call Quit Line or faxed referral                                   during hospitalization                  ( )  Recognizing danger situations (included triggers and roadblocks), if not completed on admission                    ( )  Coping skills (new ways to manage stress, exercise, relaxation techniques, changing routine, distraction), if not completed on admission                                                           ( )  Basic information about quitting (benefits of quitting, techniques in how to quit, available resources, if not completed on admission  ( ) [facility]   ( X) Patient refused referral  ( X) Patient refused counseling  ( X) Patient refused smoking cessation medication upon discharge    Vaccinations (kylah X if applicable and completed):  ( ) Patient states already received influenza vaccine elsewhere  ( ) Patient received influenza vaccine during this hospitalization  ( ) Patient refused influenza vaccine at this time  ( X) Not offered

## 2024-03-18 NOTE — PROGRESS NOTES
[facility]  Discharge Note    Pt discharged with followings belongings:   Dental Appliances: Uppers  Vision - Corrective Lenses: Eyeglasses  Hearing Aid: None  Jewelry: Ring  Body Piercings Removed: N/A  Clothing: Jacket/Coat, Pajamas, Undergarments, Socks, Footwear, Shirt, Pants (1 coat, 2 pairs of pajama pants, 1 pair of socks, 1 pair men's underwear, 1 pair of women's underwear, 1 pair of leggings, 3 tshirts, 1 tank top, 1 thermal pants, 1 pair of shoes, 1 pair of jeans, 1 pair of pants)  Other Valuables: Purse, Suitcase, Lighter/Matches, Cigarettes (1 purse, 1 duffle bag, 1 pack of cigarettes, 1 lighter, miscellaneous possessions like lottery tickets, perfume, snack cakes, BP cuffs & masks from [place], a half smoked cigar, a hygiene bag (underwear, soap, razor, etc), and hospital paperwork)   Valuables sent home with patient. Patient educated on aftercare instructions: Yes      Status EXAM upon discharge:  Mental Status and Behavioral Exam  Normal: No  Level of Assistance: Independent/Self  Facial Expression: Flat  Affect: Inappropriate  Level of Consciousness: Alert  Frequency of Checks: 4 times per hour, close  Mood:Normal: No  Mood: Labile, Anxious  Motor Activity:Normal: Yes  Motor Activity: Decreased  Eye Contact: Fair  Observed Behavior: Aggressive, Agitated  Sexual Misconduct History: Current - no  Preception: [place] to person, [place] to time, [place] to place, [place] to situation  Attention:Normal: No  Attention: Distractible, Unable to concentrate  Thought Processes: Circumstantial  Thought Content:Normal: No  Thought Content: Preoccupations  Depression Symptoms: Impaired concentration, Increased irritability  Anxiety Symptoms: Generalized  Milady Symptoms: No problems reported or observed.  Hallucinations: Other (comment)  Delusions: Yes  Delusions: Controlled  Memory:Normal: No  Memory: Confabulation  Insight and Judgment: No  Insight and Judgment: Poor judgment, Poor insight,

## 2024-03-18 NOTE — ED NOTES
Pt on the phone became very loud, she did lower there voice when this writer ask her to.  Afterward she felt the need to explain to this writer exactly how stupid she felt I was.  Pt was redirected several times.  Ativan 2mg PO given.
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
## 2024-03-18 NOTE — PLAN OF CARE
Problem: Anxiety  Goal: Will report anxiety at manageable levels  Description: INTERVENTIONS:  1. Administer medication as ordered  2. Teach and rehearse alternative coping skills  3. Provide emotional support with 1:1 interaction with staff  3/12/2024 1646 by Kavitha Guzman LPN  Outcome: Not Progressing       Problem: Coping  Goal: Pt/Family able to verbalize concerns and demonstrate effective coping strategies  Description: INTERVENTIONS:  1. Assist patient/family to identify coping skills, available support systems and cultural and spiritual values  2. Provide emotional support, including active listening and acknowledgement of concerns of patient and caregivers  3. Reduce environmental stimuli, as able  4. Instruct patient/family in relaxation techniques, as appropriate  5. Assess for spiritual pain/suffering and initiate Spiritual Care, Psychosocial Clinical Specialist consults as needed  3/12/2024 0259 by Bridgette Zhao RN  Outcome: Not Progressing     Problem: Confusion  Goal: Confusion, delirium, dementia, or psychosis is improved or at baseline  Description: INTERVENTIONS:  1. Assess for possible contributors to thought disturbance, including medications, impaired vision or hearing, underlying metabolic abnormalities, dehydration, psychiatric diagnoses, and notify attending LIP  2. [place] high risk fall precautions, as indicated  3. Provide frequent short contacts to provide reality reorientation, refocusing and direction  4. Decrease environmental stimuli, including noise as appropriate  5. Monitor and intervene to maintain adequate nutrition, hydration, elimination, sleep and activity  6. If unable to ensure safety without constant attention obtain sitter and review sitter guidelines with assigned personnel  7. Initiate Psychosocial CNS and Spiritual Care consult, as indicated    Outcome: Not Progressing      03/12/24 1634   Mental Status and Behavioral Exam   Normal No 
  Problem: Anxiety  Goal: Will report anxiety at manageable levels  Description: INTERVENTIONS:  1. Administer medication as ordered  2. Teach and rehearse alternative coping skills  3. Provide emotional support with 1:1 interaction with staff  Outcome: Progressing         Pt. Denies all. +RTIS. Labile, irritable. Nicotine almost every hour. At the desk multiple times, using the phone a lot. Had to be redirected away from the desk and limits on phone calls. Yelling multiple times today, r/t shows on the TV. +MEDS. Needs lots of redirection on boundaries and personal space. Voiced no other concerns at this time.  
  Problem: Anxiety  Goal: Will report anxiety at manageable levels  Description: INTERVENTIONS:  1. Administer medication as ordered  2. Teach and rehearse alternative coping skills  3. Provide emotional support with 1:1 interaction with staff  Outcome: Progressing     Problem: Coping  Goal: Pt/Family able to verbalize concerns and demonstrate effective coping strategies  Description: INTERVENTIONS:  1. Assist patient/family to identify coping skills, available support systems and cultural and spiritual values  2. Provide emotional support, including active listening and acknowledgement of concerns of patient and caregivers  3. Reduce environmental stimuli, as able  4. Instruct patient/family in relaxation techniques, as appropriate  5. Assess for spiritual pain/suffering and initiate Spiritual Care, Psychosocial Clinical Specialist consults as needed  Outcome: Progressing     Problem: Confusion  Goal: Confusion, delirium, dementia, or psychosis is improved or at baseline  Description: INTERVENTIONS:  1. Assess for possible contributors to thought disturbance, including medications, impaired vision or hearing, underlying metabolic abnormalities, dehydration, psychiatric diagnoses, and notify attending LIP  2. [place] high risk fall precautions, as indicated  3. Provide frequent short contacts to provide reality reorientation, refocusing and direction  4. Decrease environmental stimuli, including noise as appropriate  5. Monitor and intervene to maintain adequate nutrition, hydration, elimination, sleep and activity  6. If unable to ensure safety without constant attention obtain sitter and review sitter guidelines with assigned personnel  7. Initiate Psychosocial CNS and Spiritual Care consult, as indicated  Outcome: Progressing     Problem: Drug Abuse/Detox  Goal: Will have no detox symptoms and will verbalize plan for changing drug-related behavior  Description: INTERVENTIONS:  1. Administer medication as 
  Problem: Anxiety  Goal: Will report anxiety at manageable levels  Description: INTERVENTIONS:  1. Administer medication as ordered  2. Teach and rehearse alternative coping skills  3. Provide emotional support with 1:1 interaction with staff  Outcome: Progressing     Problem: Coping  Goal: Pt/Family able to verbalize concerns and demonstrate effective coping strategies  Description: INTERVENTIONS:  1. Assist patient/family to identify coping skills, available support systems and cultural and spiritual values  2. Provide emotional support, including active listening and acknowledgement of concerns of patient and caregivers  3. Reduce environmental stimuli, as able  4. Instruct patient/family in relaxation techniques, as appropriate  5. Assess for spiritual pain/suffering and initiate Spiritual Care, Psychosocial Clinical Specialist consults as needed  Outcome: Progressing     Problem: Confusion  Goal: Confusion, delirium, dementia, or psychosis is improved or at baseline  Description: INTERVENTIONS:  1. Assess for possible contributors to thought disturbance, including medications, impaired vision or hearing, underlying metabolic abnormalities, dehydration, psychiatric diagnoses, and notify attending LIP  2. [place] high risk fall precautions, as indicated  3. Provide frequent short contacts to provide reality reorientation, refocusing and direction  4. Decrease environmental stimuli, including noise as appropriate  5. Monitor and intervene to maintain adequate nutrition, hydration, elimination, sleep and activity  6. If unable to ensure safety without constant attention obtain sitter and review sitter guidelines with assigned personnel  7. Initiate Psychosocial CNS and Spiritual Care consult, as indicated  Outcome: Progressing     Problem: Safety - Adult  Goal: Free from fall injury  Outcome: Progressing     Problem: Pain  Goal: Verbalizes/displays adequate comfort level or baseline comfort level  Outcome: 
  Problem: Anxiety  Goal: Will report anxiety at manageable levels  Description: INTERVENTIONS:  1. Administer medication as ordered  2. Teach and rehearse alternative coping skills  3. Provide emotional support with 1:1 interaction with staff  Outcome: Progressing     Pt. Labile and irritable all shift. RTIS. Can not maintain a conversation. Continues flight of ideas. Very demanding. Yelling and cussing at nurses and peers. IM zyprexa given for agitation.   
  Problem: Confusion  Goal: Confusion, delirium, dementia, or psychosis is improved or at baseline  Description: INTERVENTIONS:  1. Assess for possible contributors to thought disturbance, including medications, impaired vision or hearing, underlying metabolic abnormalities, dehydration, psychiatric diagnoses, and notify attending LIP  2. [place] high risk fall precautions, as indicated  3. Provide frequent short contacts to provide reality reorientation, refocusing and direction  4. Decrease environmental stimuli, including noise as appropriate  5. Monitor and intervene to maintain adequate nutrition, hydration, elimination, sleep and activity  6. If unable to ensure safety without constant attention obtain sitter and review sitter guidelines with assigned personnel  7. Initiate Psychosocial CNS and Spiritual Care consult, as indicated  Outcome: Not Progressing  Flowsheets (Taken 3/13/2024 2311)  Effect of thought disturbance (confusion, delirium, dementia, or psychosis) are managed with adequate functional status: Provide frequent short contacts to provide reality reorientation, refocusing and direction   Pt has slept off and on during the shift upt to this time, 0121.Pt is eating and drinking fairly well. She is confused at times, but also has been sleepy. Denies AVH. No RTIS noted.  Pt denies SI. She was difficult to get into a conversation.  She was too sleepy to answer some questions, like the date, etc. Pt made comfortable several times.   Pt does deny any SI.  Early BP was 87/61  Pulse 86 at 2309.  Later BP repeated manually and was 90/60.  Pt ate while awake and drank juice.  Pt resting at this time, 0140.  
Alert but oriented to self only. She denies all but noted with flight of ideas. Irritable and potentially aggressive but can be re-directed. Still for further observation.   Problem: Anxiety  Goal: Will report anxiety at manageable levels  3/10/2024 2259 by Frantz Dotson, RN  Outcome: Not Progressing  Flowsheets (Taken 3/10/2024 2259)  Will report anxiety at manageable levels:   Provide emotional support with 1:1 interaction with staff   Teach and rehearse alternative coping skills   Administer medication as ordered  3/10/2024 1234 by Mariel Lima RN  Outcome: Not Progressing  Flowsheets (Taken 3/10/2024 1218)  Will report anxiety at manageable levels:   Administer medication as ordered   Teach and rehearse alternative coping skills   Provide emotional support with 1:1 interaction with staff     Problem: Coping  Goal: Pt/Family able to verbalize concerns and demonstrate effective coping strategies  3/10/2024 2259 by Frantz Dotson, RN  Outcome: Not Progressing  Flowsheets (Taken 3/10/2024 2259)  Patient/family able to verbalize anxieties, fears, and concerns, and demonstrate effective coping:   Reduce environmental stimuli, as able   Provide emotional support, including active listening and acknowledgement of concerns of patient and caregivers   Instruct patient/family in relaxation techniques, as appropriate   Assess for spiritual pain/suffering and initiate Spiritual Care, Psychosocial Clinical Specialist consults as needed   Assist patient/family to identify coping skills, available support systems and cultural and spiritual values  3/10/2024 1234 by Mariel Lima, RN  Outcome: Not Progressing  Flowsheets (Taken 3/10/2024 1218)  Patient/family able to verbalize anxieties, fears, and concerns, and demonstrate effective coping:   Reduce environmental stimuli, as able   Provide emotional support, including active listening and acknowledgement of concerns of patient and caregivers   Instruct patient/family in relaxation 
[facility]  Treatment Team Note  Review Date & Time: 03/17/24  2133    Patient was not in treatment team      Status EXAM:   Mental Status and Behavioral Exam  Normal: No  Level of Assistance: Independent/Self  Facial Expression: Hostile  Affect: Inappropriate, Unstable  Level of Consciousness: Alert  Frequency of Checks: 4 times per hour, close  Mood:Normal: No  Mood: Labile, Anxious, Irritable  Motor Activity:Normal: Yes  Motor Activity: Decreased  Eye Contact: Good  Observed Behavior: Aggressive, Threatening, Agitated, Hostile  Sexual Misconduct History: Current - no  Preception: [place] to person  Attention:Normal: No  Attention: Distractible, Unable to concentrate  Thought Processes: Flight of ideas, Perseveration, Loose association  Thought Content:Normal: No  Thought Content: Delusions, Paranoia, Poverty of content  Depression Symptoms: Impaired concentration, Increased irritability  Anxiety Symptoms: Generalized  [place] Symptoms: Flight of ideas, [place], Labile, Poor judgment, Rapid cycling  Hallucinations: None, Other (comment) (pt denies but is RTIS)  Delusions: Yes  Delusions: Paranoid, Persecutory  Memory:Normal: No  Memory: Confabulation, Poor recent, Poor remote  Insight and Judgment: No  Insight and Judgment: Poor judgment, Poor insight      Suicide Risk CSSR-S:  1) Within the past month, have you wished you were dead or wished you could go to sleep and not wake up? : No  2) Have you actually had any thoughts of killing yourself? : No  6) Have you ever done anything, started to do anything, or prepared to do anything to end your life?: No      PLAN/TREATMENT RECOMMENDATIONS UPDATE:   Patient will take medication as prescribed, eat 75% of meals, attend groups, participate in milieu activities, participate in treatment team and care planning for discharge and follow up.           Olga Lidia Sanchez RN   
Patient appears physically well, alert & oriented to self only. Able to cooperate during care but needs frequent redirection. During interaction, noted with FOI, poverty of content, can be irritable & verbally aggressive. Shouts at co patients at times demanding to use the phone, bangs her door & slams the desk at the team station when not able to get what she wants. Limits were set with the patient, can be redirected but needs frequent redirection. Took PRN meds, please see MAR. Safe environment provided & maintained. To continue to monitor patient at this time.     Problem: Safety - Adult  Goal: Free from fall injury  Outcome: Progressing     Problem: Anxiety  Goal: Will report anxiety at manageable levels  Description: INTERVENTIONS:  1. Administer medication as ordered  2. Teach and rehearse alternative coping skills  3. Provide emotional support with 1:1 interaction with staff  3/12/2024 0259 by Bridgette Zhao, RN  Outcome: Not Progressing  3/11/2024 1740 by Violetta Justice, RN  Outcome: Progressing     Problem: Coping  Goal: Pt/Family able to verbalize concerns and demonstrate effective coping strategies  Description: INTERVENTIONS:  1. Assist patient/family to identify coping skills, available support systems and cultural and spiritual values  2. Provide emotional support, including active listening and acknowledgement of concerns of patient and caregivers  3. Reduce environmental stimuli, as able  4. Instruct patient/family in relaxation techniques, as appropriate  5. Assess for spiritual pain/suffering and initiate Spiritual Care, Psychosocial Clinical Specialist consults as needed  Outcome: Not Progressing     Problem: Confusion  Goal: Confusion, delirium, dementia, or psychosis is improved or at baseline  Description: INTERVENTIONS:  1. Assess for possible contributors to thought disturbance, including medications, impaired vision or hearing, underlying metabolic abnormalities, 
Pt has been visible and social on the unit this shift. Pt has been agitated, irritable, hostile, and verbally aggressive. Pt continues to demand xanax. Pt appears confused and talks nonsensical. Pt stated her  was downstairs in the morgue pretending to be dead. Pt was noncompliant with taking nightly Haloperidol. Writer attempted to get pt to take this medication x3 but pt stated each time that she is allergic to this medication and refused to take it. Pt denies SI/HI/AVH but appears to be RTIS/talking to self.     At 0000, pt came to the team station with increased agitation and irritability. Pt threw a cup of water on pt and jumped on top of the counter but got down with staff and security assistance. PRN zyprexa given at 0002.     PRN ibuprofen given at 2100 for a headache with effectiveness.    PRN nicotine lozenge given at 2004, 2106, 2215, 2354.    PRN atarax and trazodone given at 2215 with effectiveness.           Problem: Anxiety  Goal: Will report anxiety at manageable levels  Description: INTERVENTIONS:  1. Administer medication as ordered  2. Teach and rehearse alternative coping skills  3. Provide emotional support with 1:1 interaction with staff  Outcome: Not Progressing     Problem: Coping  Goal: Pt/Family able to verbalize concerns and demonstrate effective coping strategies  Description: INTERVENTIONS:  1. Assist patient/family to identify coping skills, available support systems and cultural and spiritual values  2. Provide emotional support, including active listening and acknowledgement of concerns of patient and caregivers  3. Reduce environmental stimuli, as able  4. Instruct patient/family in relaxation techniques, as appropriate  5. Assess for spiritual pain/suffering and initiate Spiritual Care, Psychosocial Clinical Specialist consults as needed  Outcome: Not Progressing     Problem: Confusion  Goal: Confusion, delirium, dementia, or psychosis is improved or at baseline  Description: 
Pt has been visible on the unit this evening. Pt has been sleeping on/off throughout the shift. Pt has been agitated, verbally abusive, and hostile at times. Pt was noncompliant with scheduled Haldol. Writer educated the importance of medication compliance but pt is very adamant that she is allergic to Haldol and would not take it. Pt denies SI/HI/AVH but appears to be RTIS.     PRN zyprexa given at 2047 and 0133 with effectiveness.     PRN ibuprofen given at 2316 for generalized body aches with effectiveness.     PRN nicotine lozenge given at 2047, 2316, 0133, 0327, 0711.    PRN ativan given at 0133 with effectiveness.          Problem: Anxiety  Goal: Will report anxiety at manageable levels  Description: INTERVENTIONS:  1. Administer medication as ordered  2. Teach and rehearse alternative coping skills  3. Provide emotional support with 1:1 interaction with staff  Outcome: Not Progressing     Problem: Coping  Goal: Pt/Family able to verbalize concerns and demonstrate effective coping strategies  Description: INTERVENTIONS:  1. Assist patient/family to identify coping skills, available support systems and cultural and spiritual values  2. Provide emotional support, including active listening and acknowledgement of concerns of patient and caregivers  3. Reduce environmental stimuli, as able  4. Instruct patient/family in relaxation techniques, as appropriate  5. Assess for spiritual pain/suffering and initiate Spiritual Care, Psychosocial Clinical Specialist consults as needed  Outcome: Not Progressing     Problem: Confusion  Goal: Confusion, delirium, dementia, or psychosis is improved or at baseline  Description: INTERVENTIONS:  1. Assess for possible contributors to thought disturbance, including medications, impaired vision or hearing, underlying metabolic abnormalities, dehydration, psychiatric diagnoses, and notify attending LIP  2. [place] high risk fall precautions, as indicated  3. Provide frequent short 
as appropriate  5. Monitor and intervene to maintain adequate nutrition, hydration, elimination, sleep and activity  6. If unable to ensure safety without constant attention obtain sitter and review sitter guidelines with assigned personnel  7. Initiate Psychosocial CNS and Spiritual Care consult, as indicated  3/15/2024 0925 by Mariel Lima, RN  Outcome: Not Progressing  Flowsheets (Taken 3/15/2024 0904)  Effect of thought disturbance (confusion, delirium, dementia, or psychosis) are managed with adequate functional status: Decrease environmental stimuli, including noise as appropriate  3/15/2024 0344 by Bobby Hammond RN  Outcome: Progressing     Problem: [place]  Goal: Will exhibit normal sleep and speech and no impulsivity  Description: INTERVENTIONS:  1. Administer medication as ordered  2. Set limits on impulsive behavior  3. Make attempts to decrease external stimuli as possible  3/15/2024 0925 by Mariel Lima, RN  Outcome: Not Progressing  3/15/2024 0344 by Bobby Hammond RN  Outcome: Progressing     Problem: Sleep Disturbance  Goal: Will exhibit normal sleeping pattern  Description: INTERVENTIONS:  1. Administer medication as ordered  2. Decrease environmental stimuli, including noise, as appropriate  3. Discourage social isolation and naps during the day  Outcome: Not Progressing     
confused, and talking to her self, and rambling in conversation.      Problem: Confusion  Goal: Confusion, delirium, dementia, or psychosis is improved or at baseline  Description: INTERVENTIONS:  1. Assess for possible contributors to thought disturbance, including medications, impaired vision or hearing, underlying metabolic abnormalities, dehydration, psychiatric diagnoses, and notify attending LIP  2. [place] high risk fall precautions, as indicated  3. Provide frequent short contacts to provide reality reorientation, refocusing and direction  4. Decrease environmental stimuli, including noise as appropriate  5. Monitor and intervene to maintain adequate nutrition, hydration, elimination, sleep and activity  6. If unable to ensure safety without constant attention obtain sitter and review sitter guidelines with assigned personnel  7. Initiate Psychosocial CNS and Spiritual Care consult, as indicated  Outcome: Not Progressing  Flowsheets (Taken 3/12/2024 2113)  Effect of thought disturbance (confusion, delirium, dementia, or psychosis) are managed with adequate functional status:   Decrease environmental stimuli, including noise as appropriate   Monitor and intervene to maintain adequate nutrition, hydration, elimination, sleep and activity     
hearing, underlying metabolic abnormalities, dehydration, psychiatric diagnoses, and notify attending LIP  2. [place] high risk fall precautions, as indicated  3. Provide frequent short contacts to provide reality reorientation, refocusing and direction  4. Decrease environmental stimuli, including noise as appropriate  5. Monitor and intervene to maintain adequate nutrition, hydration, elimination, sleep and activity  6. If unable to ensure safety without constant attention obtain sitter and review sitter guidelines with assigned personnel  7. Initiate Psychosocial CNS and Spiritual Care consult, as indicated  3/16/2024 0942 by Jazmin Mejia RN  Outcome: Not Progressing  3/15/2024 2043 by Chrissy Shafer LPN  Outcome: Not Progressing     Problem: [place]  Goal: Will exhibit normal sleep and speech and no impulsivity  Description: INTERVENTIONS:  1. Administer medication as ordered  2. Set limits on impulsive behavior  3. Make attempts to decrease external stimuli as possible  Outcome: Not Progressing     Problem: Behavior  Goal: Pt/Family maintain appropriate behavior and adhere to behavioral management agreement, if implemented  Description: INTERVENTIONS:  1. Assess patient/family's coping skills and  non-compliant behavior (including use of illegal substances)  2. Notify security of behavior or suspected illegal substances which indicate the need for search of the family and/or belongings  3. Encourage verbalization of thoughts and concerns in a socially appropriate manner  4. Utilize positive, consistent limit setting strategies supporting safety of patient, staff and others  5. Encourage participation in the decision making process about the behavioral management agreement  6. If a visitor's behavior poses a threat to safety call refer to organization policy.  7. Initiate consult with , Psychosocial CNS, Spiritual Care as appropriate  Outcome: Not Progressing  Flowsheets (Taken 3/16/2024 
indicated  3. Provide frequent short contacts to provide reality reorientation, refocusing and direction  4. Decrease environmental stimuli, including noise as appropriate  5. Monitor and intervene to maintain adequate nutrition, hydration, elimination, sleep and activity  6. If unable to ensure safety without constant attention obtain sitter and review sitter guidelines with assigned personnel  7. Initiate Psychosocial CNS and Spiritual Care consult, as indicated  3/15/2024 2043 by Chrissy Shafer LPN  Outcome: Not Progressing     Problem: Anxiety  Goal: Will report anxiety at manageable levels  Description: INTERVENTIONS:  1. Administer medication as ordered  2. Teach and rehearse alternative coping skills  3. Provide emotional support with 1:1 interaction with staff  3/15/2024 2043 by Chrissy Shafer LPN  Outcome: Not Progressing  3/15/2024 0925 by Mariel Lima, RN  Outcome: Progressing  Flowsheets (Taken 3/15/2024 0904)  Will report anxiety at manageable levels:   Administer medication as ordered   Teach and rehearse alternative coping skills   Provide emotional support with 1:1 interaction with staff     Problem: Coping  Goal: Pt/Family able to verbalize concerns and demonstrate effective coping strategies  Description: INTERVENTIONS:  1. Assist patient/family to identify coping skills, available support systems and cultural and spiritual values  2. Provide emotional support, including active listening and acknowledgement of concerns of patient and caregivers  3. Reduce environmental stimuli, as able  4. Instruct patient/family in relaxation techniques, as appropriate  5. Assess for spiritual pain/suffering and initiate Spiritual Care, Psychosocial Clinical Specialist consults as needed  3/15/2024 2043 by Chrissy Shafer LPN  Outcome: Not Progressing  3/15/2024 0925 by Mariel Lima, RN  Outcome: Not Progressing  Flowsheets (Taken 3/15/2024 0904)  Patient/family able to verbalize anxieties, fears, and 
indicated  3/18/2024 1435 by Lidia Boone RN, BSN  Outcome: Completed  3/18/2024 0102 by Beth Wlalace RN  Outcome: Not Progressing     Problem: Milady  Goal: Will exhibit normal sleep and speech and no impulsivity  Description: INTERVENTIONS:  1. Administer medication as ordered  2. Set limits on impulsive behavior  3. Make attempts to decrease external stimuli as possible  3/18/2024 1435 by Lidia Boone RN, BSN  Outcome: Completed  3/18/2024 0102 by Beth Wallace RN  Outcome: Not Progressing     Problem: Behavior  Goal: Pt/Family maintain appropriate behavior and adhere to behavioral management agreement, if implemented  Description: INTERVENTIONS:  1. Assess patient/family's coping skills and  non-compliant behavior (including use of illegal substances)  2. Notify security of behavior or suspected illegal substances which indicate the need for search of the family and/or belongings  3. Encourage verbalization of thoughts and concerns in a socially appropriate manner  4. Utilize positive, consistent limit setting strategies supporting safety of patient, staff and others  5. Encourage participation in the decision making process about the behavioral management agreement  6. If a visitor's behavior poses a threat to safety call refer to organization policy.  7. Initiate consult with , Psychosocial CNS, Spiritual Care as appropriate  3/18/2024 1435 by Lidia Boone RN, BSN  Outcome: Completed  3/18/2024 0102 by Beth Wallace RN  Outcome: Not Progressing     
metabolic abnormalities, dehydration, psychiatric diagnoses, and notify attending LIP  2. [place] high risk fall precautions, as indicated  3. Provide frequent short contacts to provide reality reorientation, refocusing and direction  4. Decrease environmental stimuli, including noise as appropriate  5. Monitor and intervene to maintain adequate nutrition, hydration, elimination, sleep and activity  6. If unable to ensure safety without constant attention obtain sitter and review sitter guidelines with assigned personnel  7. Initiate Psychosocial CNS and Spiritual Care consult, as indicated  3/17/2024 1156 by Jazmin Mejia RN  Outcome: Not Progressing  3/16/2024 2254 by Beth Wallace RN  Outcome: Not Progressing     Problem: Safety - Adult  Goal: Free from fall injury  3/17/2024 1156 by Jazmin Mejia RN  Outcome: Not Progressing  3/16/2024 2254 by Beth Wallace RN  Outcome: Progressing     Problem: Milady  Goal: Will exhibit normal sleep and speech and no impulsivity  Description: INTERVENTIONS:  1. Administer medication as ordered  2. Set limits on impulsive behavior  3. Make attempts to decrease external stimuli as possible  3/17/2024 1156 by Jazmin Mejia RN  Outcome: Not Progressing  3/16/2024 2254 by Beth Wallace RN  Outcome: Not Progressing     Problem: Behavior  Goal: Pt/Family maintain appropriate behavior and adhere to behavioral management agreement, if implemented  Description: INTERVENTIONS:  1. Assess patient/family's coping skills and  non-compliant behavior (including use of illegal substances)  2. Notify security of behavior or suspected illegal substances which indicate the need for search of the family and/or belongings  3. Encourage verbalization of thoughts and concerns in a socially appropriate manner  4. Utilize positive, consistent limit setting strategies supporting safety of patient, staff and others  5. Encourage participation in the decision making process about the 
substances which indicate the need for search of the family and/or belongings  3. Encourage verbalization of thoughts and concerns in a socially appropriate manner  4. Utilize positive, consistent limit setting strategies supporting safety of patient, staff and others  5. Encourage participation in the decision making process about the behavioral management agreement  6. If a visitor's behavior poses a threat to safety call refer to organization policy.  7. Initiate consult with , Psychosocial CNS, Spiritual Care as appropriate  Outcome: Progressing     Problem: Sleep Disturbance  Goal: Will exhibit normal sleeping pattern  Description: INTERVENTIONS:  1. Administer medication as ordered  2. Decrease environmental stimuli, including noise, as appropriate  3. Discourage social isolation and naps during the day  Outcome: Progressing     Problem: Confusion  Goal: Confusion, delirium, dementia, or psychosis is improved or at baseline  Description: INTERVENTIONS:  1. Assess for possible contributors to thought disturbance, including medications, impaired vision or hearing, underlying metabolic abnormalities, dehydration, psychiatric diagnoses, and notify attending LIP  2. [place] high risk fall precautions, as indicated  3. Provide frequent short contacts to provide reality reorientation, refocusing and direction  4. Decrease environmental stimuli, including noise as appropriate  5. Monitor and intervene to maintain adequate nutrition, hydration, elimination, sleep and activity  6. If unable to ensure safety without constant attention obtain sitter and review sitter guidelines with assigned personnel  7. Initiate Psychosocial CNS and Spiritual Care consult, as indicated  3/14/2024 0746 by Cr Desai, RN  Outcome: Progressing  3/14/2024 0101 by Bobby Hammond, RN  Outcome: Not Progressing  Flowsheets (Taken 3/13/2024 2311)  Effect of thought disturbance (confusion, delirium, dementia, or psychosis) 
verbalize anxieties, fears, and concerns, and demonstrate effective coping:   Reduce environmental stimuli, as able   Provide emotional support, including active listening and acknowledgement of concerns of patient and caregivers   Instruct patient/family in relaxation techniques, as appropriate  3/10/2024 0043 by Flavia Baron, RN  Outcome: Progressing     Problem: Confusion  Goal: Confusion, delirium, dementia, or psychosis is improved or at baseline  Description: INTERVENTIONS:  1. Assess for possible contributors to thought disturbance, including medications, impaired vision or hearing, underlying metabolic abnormalities, dehydration, psychiatric diagnoses, and notify attending LIP  2. [place] high risk fall precautions, as indicated  3. Provide frequent short contacts to provide reality reorientation, refocusing and direction  4. Decrease environmental stimuli, including noise as appropriate  5. Monitor and intervene to maintain adequate nutrition, hydration, elimination, sleep and activity  6. If unable to ensure safety without constant attention obtain sitter and review sitter guidelines with assigned personnel  7. Initiate Psychosocial CNS and Spiritual Care consult, as indicated  3/10/2024 1234 by Mariel Lima, RN  Outcome: Not Progressing  Flowsheets (Taken 3/10/2024 1218)  Effect of thought disturbance (confusion, delirium, dementia, or psychosis) are managed with adequate functional status: Decrease environmental stimuli, including noise as appropriate  3/10/2024 0043 by Flavia Baron RN  Outcome: Progressing     Problem: Drug Abuse/Detox  Goal: Will have no detox symptoms and will verbalize plan for changing drug-related behavior  Description: INTERVENTIONS:  1. Administer medication as ordered  2. Monitor physical status  3. Provide emotional support with 1:1 interaction with staff  4. Encourage  recovery focused treatment   3/10/2024 1234 by Mariel Lima, RN  Outcome: Not
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
## 2024-03-19 ENCOUNTER — FOLLOWUP TELEPHONE ENCOUNTER (OUTPATIENT)
Dept: PSYCHIATRY | Age: 44
End: 2024-03-19